# Patient Record
Sex: FEMALE | Race: WHITE | NOT HISPANIC OR LATINO
[De-identification: names, ages, dates, MRNs, and addresses within clinical notes are randomized per-mention and may not be internally consistent; named-entity substitution may affect disease eponyms.]

---

## 2024-01-14 ENCOUNTER — APPOINTMENT (OUTPATIENT)
Dept: ORTHOPEDIC SURGERY | Facility: CLINIC | Age: 64
End: 2024-01-14
Payer: COMMERCIAL

## 2024-01-14 DIAGNOSIS — S52.135A NONDISPLACED FRACTURE OF NECK OF LEFT RADIUS, INITIAL ENCOUNTER FOR CLOSED FRACTURE: ICD-10-CM

## 2024-01-14 DIAGNOSIS — I10 ESSENTIAL (PRIMARY) HYPERTENSION: ICD-10-CM

## 2024-01-14 PROCEDURE — 99203 OFFICE O/P NEW LOW 30 MIN: CPT

## 2024-01-14 RX ORDER — LOSARTAN POTASSIUM 25 MG/1
25 TABLET, FILM COATED ORAL
Refills: 0 | Status: ACTIVE | COMMUNITY

## 2024-01-14 RX ORDER — LEVOTHYROXINE SODIUM 0.1 MG/1
100 TABLET ORAL
Refills: 0 | Status: ACTIVE | COMMUNITY

## 2024-01-14 RX ORDER — ANASTROZOLE TABLETS 1 MG/1
1 TABLET ORAL
Refills: 0 | Status: ACTIVE | COMMUNITY

## 2024-01-14 NOTE — HISTORY OF PRESENT ILLNESS
[Sudden] : sudden [9] : 9 [0] : 0 [Burning] : burning [Dull/Aching] : dull/aching [Ice] : ice [] : no [FreeTextEntry1] : left elbow [FreeTextEntry3] : 1/13/24 [FreeTextEntry5] : Walking into a restaurant and tripped over a cement lip. She was seen at [facility] who diagnosed her with a radial head fracture and placed her into a sling/splint.  [de-identified] : Movement  [FreeTextEntry9] : Splint  [de-identified] : 1/14/24 [de-identified] : [facility] [de-identified] : [facility] @ [facility]

## 2024-01-14 NOTE — PHYSICAL EXAM
[4___] : supination 4[unfilled]/5 [] : light touch intact [Left] : left elbow [Outside films reviewed] : Outside films reviewed [FreeTextEntry1] : Nondisplaced radial neck fracture in good alignment.  [TWNoteComboBox7] : flexion 100 degrees [TWNoteComboBox4] : extension 0 degrees [TWNoteComboBox6] : pronation 90 degrees [de-identified] : supination 70 degrees

## 2024-01-14 NOTE — ASSESSMENT
[FreeTextEntry1] : Left nondisplaced radial neck fracture - Maintain sling for comfort. - Gentle ROM as tolerated. - Rest, ice, Tylenol PRN for pain. - Avoid painful activity. DARREN CONKLIN. - All questions answered. - F/u 1 week.

## 2024-01-22 ENCOUNTER — APPOINTMENT (OUTPATIENT)
Dept: ORTHOPEDIC SURGERY | Facility: CLINIC | Age: 64
End: 2024-01-22
Payer: COMMERCIAL

## 2024-01-22 VITALS — BODY MASS INDEX: 20.66 KG/M2 | HEIGHT: 65 IN | WEIGHT: 124 LBS

## 2024-01-22 DIAGNOSIS — Z78.9 OTHER SPECIFIED HEALTH STATUS: ICD-10-CM

## 2024-01-22 DIAGNOSIS — S50.01XA CONTUSION OF RIGHT ELBOW, INITIAL ENCOUNTER: ICD-10-CM

## 2024-01-22 DIAGNOSIS — Z86.39 PERSONAL HISTORY OF OTHER ENDOCRINE, NUTRITIONAL AND METABOLIC DISEASE: ICD-10-CM

## 2024-01-22 PROCEDURE — 99213 OFFICE O/P EST LOW 20 MIN: CPT

## 2024-01-22 PROCEDURE — 73080 X-RAY EXAM OF ELBOW: CPT | Mod: LT

## 2024-01-22 NOTE — DISCUSSION/SUMMARY
[de-identified] : Discussed the nature of the diagnosis and risk and benefits of different modalities of treatment. Reviewed and discussed x-rays with patient and partner Elbow contusion with suspicion for fx Avoid any type of lifting or driving Discussed bending and extending exercises Follow up in 2 weeks

## 2024-01-22 NOTE — HISTORY OF PRESENT ILLNESS
[0] : 0 [Dull/Aching] : dull/aching [Rest] : rest [Retired] : Work status: retired [de-identified] : 63 female here for right elbow pain after falling on 1/13/24. Went to  and was told she had a fracture [] : no [FreeTextEntry9] : sling [FreeTextEntry3] : 1/13/24 [de-identified] : activity, lifting  [de-identified] : XR- pacs  [de-identified] : [facility]

## 2024-01-22 NOTE — DATA REVIEWED
[Outside X-rays] : outside x-rays [Right] : of the right [Elbow] : elbow [FreeTextEntry1] : possible nondisplaced fx of radial neck

## 2024-02-05 ENCOUNTER — APPOINTMENT (OUTPATIENT)
Dept: ORTHOPEDIC SURGERY | Facility: CLINIC | Age: 64
End: 2024-02-05
Payer: COMMERCIAL

## 2024-02-05 VITALS — BODY MASS INDEX: 20.66 KG/M2 | HEIGHT: 65 IN | WEIGHT: 124 LBS

## 2024-02-05 PROCEDURE — 99024 POSTOP FOLLOW-UP VISIT: CPT

## 2024-02-05 PROCEDURE — 73080 X-RAY EXAM OF ELBOW: CPT | Mod: LT

## 2024-02-05 NOTE — PHYSICAL EXAM
[Left] : left elbow [NL (90)] : supination 90 degrees [Right] : right elbow [The fracture is in acceptable alignment. There is progression in healing seen] : The fracture is in acceptable alignment. There is progression in healing seen [FreeTextEntry9] :  [TWNoteComboBox7] : flexion 130 degrees

## 2024-02-05 NOTE — HISTORY OF PRESENT ILLNESS
[0] : 0 [Dull/Aching] : dull/aching [Rest] : rest [Retired] : Work status: retired [de-identified] : 63 year old female followed for Closed nondisplaced fracture of neck of left radius.  DOI: 1/13/24  [] : no [FreeTextEntry3] : 1/13/24 [FreeTextEntry9] : sling [de-identified] : activity, lifting  [de-identified] : [facility]  [de-identified] : XR- pacs

## 2024-02-05 NOTE — DISCUSSION/SUMMARY
[de-identified] : Discussed the nature of the diagnosis and risk and benefits of different modalities of treatment. RT elbow x-rays reviewed and discussed.  RTO 3 weeks.

## 2024-02-26 ENCOUNTER — APPOINTMENT (OUTPATIENT)
Dept: ORTHOPEDIC SURGERY | Facility: CLINIC | Age: 64
End: 2024-02-26
Payer: COMMERCIAL

## 2024-02-26 VITALS — BODY MASS INDEX: 20.66 KG/M2 | HEIGHT: 65 IN | WEIGHT: 124 LBS

## 2024-02-26 DIAGNOSIS — S52.135D NONDISPLACED FRACTURE OF NECK OF LEFT RADIUS, SUBSEQUENT ENCOUNTER FOR CLOSED FRACTURE WITH ROUTINE HEALING: ICD-10-CM

## 2024-02-26 PROCEDURE — 99024 POSTOP FOLLOW-UP VISIT: CPT

## 2024-02-26 NOTE — DISCUSSION/SUMMARY
[de-identified] : Discussed the nature of the diagnosis and risk and benefits of different modalities of treatment. She is doing well.  PRN.

## 2024-02-26 NOTE — PHYSICAL EXAM
[Left] : left elbow [] : no erythema [de-identified] : Elbow ROM  0- 150, Full supiantion and pronation

## 2024-02-26 NOTE — HISTORY OF PRESENT ILLNESS
[0] : 0 [Retired] : Work status: retired [de-identified] : 63 year old female followed for Closed nondisplaced fracture of neck of left radius. she is doing well. No pain.  DOI: 1/13/24 [de-identified] : no treatment